# Patient Record
Sex: FEMALE | Race: WHITE | URBAN - METROPOLITAN AREA
[De-identification: names, ages, dates, MRNs, and addresses within clinical notes are randomized per-mention and may not be internally consistent; named-entity substitution may affect disease eponyms.]

---

## 2019-04-11 ENCOUNTER — OFFICE VISIT (OUTPATIENT)
Dept: URBAN - METROPOLITAN AREA CLINIC 76 | Facility: CLINIC | Age: 59
End: 2019-04-11
Payer: COMMERCIAL

## 2019-04-11 DIAGNOSIS — H25.13 AGE-RELATED NUCLEAR CATARACT, BILATERAL: ICD-10-CM

## 2019-04-11 DIAGNOSIS — H31.091 OTHER CHORIORETINAL SCARS, RIGHT EYE: ICD-10-CM

## 2019-04-11 PROCEDURE — 92015 DETERMINE REFRACTIVE STATE: CPT | Performed by: OPTOMETRIST

## 2019-04-11 PROCEDURE — 92014 COMPRE OPH EXAM EST PT 1/>: CPT | Performed by: OPTOMETRIST

## 2019-04-11 ASSESSMENT — KERATOMETRY
OS: 44.25
OD: 43.63

## 2019-04-11 ASSESSMENT — VISUAL ACUITY
OS: 20/25
OD: 20/25

## 2019-04-11 ASSESSMENT — INTRAOCULAR PRESSURE
OD: 15
OS: 15

## 2019-05-09 ENCOUNTER — OFFICE VISIT (OUTPATIENT)
Dept: URBAN - METROPOLITAN AREA CLINIC 76 | Facility: CLINIC | Age: 59
End: 2019-05-09
Payer: COMMERCIAL

## 2019-05-09 DIAGNOSIS — H33.8 OTHER RETINAL DETACHMENTS: ICD-10-CM

## 2019-05-09 DIAGNOSIS — H52.4 PRESBYOPIA: ICD-10-CM

## 2019-05-09 PROCEDURE — 92014 COMPRE OPH EXAM EST PT 1/>: CPT | Performed by: OPHTHALMOLOGY

## 2019-05-09 PROCEDURE — 92004 COMPRE OPH EXAM NEW PT 1/>: CPT | Performed by: OPHTHALMOLOGY

## 2019-05-09 ASSESSMENT — VISUAL ACUITY
OS: 20/30
OD: 20/30

## 2019-05-09 ASSESSMENT — INTRAOCULAR PRESSURE
OS: 14
OD: 14

## 2019-05-09 ASSESSMENT — KERATOMETRY
OD: 43.50
OS: 44.63

## 2019-05-09 NOTE — IMPRESSION/PLAN
Impression: Presbyopia: H52.4. OU. Plan: Patient holding off on Cat sx and would like to have updated glasses. Will task Dr Glenroy Barrera to finalize Rx.

## 2019-05-09 NOTE — IMPRESSION/PLAN
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
by Hx per patient 2004 S/P RD repair Plan: Continue to monitor. Discussed added risk.

## 2019-05-09 NOTE — IMPRESSION/PLAN
Impression: Age-related nuclear cataract, bilateral: H25.13. OU. Plan: Cataracts account for the patient's complaints. No treatment currently recommended. The patient will monitor vision changes and contact us with any decrease in vision. Patient would like to hold off on Cataract sx at this time.

## 2021-10-30 NOTE — IMPRESSION/PLAN
Impression: Age-related nuclear cataract, bilateral: H25.13. OU. Plan: Discussed diagnosis & treatment options with patient. Hold off on new gls, pt aware new glasses will not improve vision as much as cat surgery potentially could. Pt understands and wishes to proceed with surgery. Recommend CEIOL OD then OS. STANDARD IOL. Target: Distance. RL2. Schedule ASCAN then Pre Op with Dr. Cornelio Bauer. Needs BAT and BP.
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 s/p laser RD repair OD Plan: Will continue to observe/monitor.
Impression: Presbyopia: H52.4. OU. Plan: Discussed Dx with pt.  Rec hold off on new Rx pending results of Cat moniqueal.
normal balance